# Patient Record
Sex: FEMALE | Race: WHITE
[De-identification: names, ages, dates, MRNs, and addresses within clinical notes are randomized per-mention and may not be internally consistent; named-entity substitution may affect disease eponyms.]

---

## 2018-11-01 ENCOUNTER — HOSPITAL ENCOUNTER (INPATIENT)
Dept: HOSPITAL 25 - MCHOBOUT | Age: 33
LOS: 2 days | Discharge: HOME | End: 2018-11-03
Attending: OBSTETRICS & GYNECOLOGY | Admitting: OBSTETRICS & GYNECOLOGY
Payer: COMMERCIAL

## 2018-11-01 DIAGNOSIS — O34.211: ICD-10-CM

## 2018-11-01 DIAGNOSIS — Z3A.39: ICD-10-CM

## 2018-11-01 DIAGNOSIS — E03.8: ICD-10-CM

## 2018-11-01 LAB
BASOPHILS # BLD AUTO: 0 10^3/UL (ref 0–0.2)
EOSINOPHIL # BLD AUTO: 0.1 10^3/UL (ref 0–0.6)
HCT VFR BLD AUTO: 40 % (ref 35–47)
HGB BLD-MCNC: 13 G/DL (ref 12–16)
LYMPHOCYTES # BLD AUTO: 2.1 10^3/UL (ref 1–4.8)
MCH RBC QN AUTO: 28 PG (ref 27–31)
MCHC RBC AUTO-ENTMCNC: 33 G/DL (ref 31–36)
MCV RBC AUTO: 85 FL (ref 80–97)
MONOCYTES # BLD AUTO: 0.7 10^3/UL (ref 0–0.8)
NEUTROPHILS # BLD AUTO: 7.7 10^3/UL (ref 1.5–7.7)
NRBC # BLD AUTO: 0 10^3/UL
NRBC BLD QL AUTO: 0
PLATELET # BLD AUTO: 229 10^3/UL (ref 150–450)
RBC # BLD AUTO: 4.68 10^6/UL (ref 4–5.4)
WBC # BLD AUTO: 10.7 10^3/UL (ref 3.5–10.8)

## 2018-11-01 PROCEDURE — 36415 COLL VENOUS BLD VENIPUNCTURE: CPT

## 2018-11-01 PROCEDURE — 86901 BLOOD TYPING SEROLOGIC RH(D): CPT

## 2018-11-01 PROCEDURE — 85025 COMPLETE CBC W/AUTO DIFF WBC: CPT

## 2018-11-01 PROCEDURE — 86850 RBC ANTIBODY SCREEN: CPT

## 2018-11-01 PROCEDURE — 0KQM0ZZ REPAIR PERINEUM MUSCLE, OPEN APPROACH: ICD-10-PCS | Performed by: OBSTETRICS & GYNECOLOGY

## 2018-11-01 PROCEDURE — 86900 BLOOD TYPING SEROLOGIC ABO: CPT

## 2018-11-01 PROCEDURE — 10907ZC DRAINAGE OF AMNIOTIC FLUID, THERAPEUTIC FROM PRODUCTS OF CONCEPTION, VIA NATURAL OR ARTIFICIAL OPENING: ICD-10-PCS | Performed by: OBSTETRICS & GYNECOLOGY

## 2018-11-01 RX ADMIN — IBUPROFEN PRN MG: 600 TABLET, FILM COATED ORAL at 16:33

## 2018-11-01 RX ADMIN — DOCUSATE SODIUM SCH: 100 CAPSULE, LIQUID FILLED ORAL at 09:41

## 2018-11-01 RX ADMIN — DOCUSATE SODIUM SCH: 100 CAPSULE, LIQUID FILLED ORAL at 20:05

## 2018-11-01 RX ADMIN — DOCUSATE SODIUM SCH: 100 CAPSULE, LIQUID FILLED ORAL at 18:55

## 2018-11-01 RX ADMIN — IBUPROFEN PRN MG: 600 TABLET, FILM COATED ORAL at 09:42

## 2018-11-01 NOTE — HP
General Information





- Reason for Visit


Irreg ctx throughout the day, but increased intensity about 2-3 hrs ago.


Pt with h/o  and subsequent .  Desires another .





- General Information


Maternal Age: 33


Grav: 3


Para: 2


SAB: 0


IEA: 0





Estimated Due Date: 18


Determined By: LMP


Gestational Age in Weeks/Days: 39+5 wks


Maternal Blood Type and Rh: O Positive





- Results this Pregnancy


Serology/RPR Result: Non-Reactive


Rubella Result: Immune


HBsAg Result: Negative


HIV Result: Negative


GBS Culture Result: Positive





Past Medical History


Delivery History: Hx C/Section, Hx Uncomplicated Vaginal Delivery


Pertinent Past Medical History: See  Records


Past Medical History Comment: 


Hashimoto's, IBS, migraines


Pertinent Past Surgical History: See  Records


Past Surgical History Comment: 


, cholecytectomy, ulnar nerve


Pertinent Family History: Non-Contributory





- Antepartal Records


Antepartal Records: Reviewed, Pregnancy Complicated by: - h/o  and 





Review of Systems


Constitutional: Uncomfortable


CV Complaint: No


Respiratory: Shortness of Breath: No


Gastrointestinal: No Nausea/Vomiting


Genitourinary: No Dysuria, No Bleeding, No Leaking Fluid


Musculoskeletal: Contractions


Neurological: No Headache


Fetal Movement: Normal





Exam


Allergies/Adverse Reactions: 


Allergies





No Known Allergies Allergy (Verified 18 14:08)


 








156/88 (during ctx), afebrile





- Measurements


Height: 5 ft 7 in


Weight: 161 lb


Weight in lbs: [id number]


Body Mass Index (BMI): 25.2


Pre-Pregnancy Weight: 124 lb


Weight Gained This Pregnancy: 37 lbs and 0 ozs





- Exam


Breast: Breast Exam Deferred


Heart: Normal Rhythm/Heart Sounds


HEENT: No Significant Findings


Lungs: Clear Bilaterally


Rectal: Rectal Exam Deferred





- Abdominal Exam


Abdomen Exam: Non-Tender, Fundal Height Consistent with Dates





- Ultrasound/Biophysical Profile


Ultrasound Status: Not Done





Targeted Exam Findings


Estimated Fetal Weight: 7lb 8oz


Cervical Exam: 6cm


Effacement: 90%


Station: 0


Presenting Part: Vertex


Membrane Status: Intact





EFM Findings





- External Fetal Monitor Findings


Baseline Fetal Heart Rate: 125


External Fetal Monitor Findings: Accelerations Present, No Pattern of Variable 

or Late Decelerations, Variability Moderate


Contractions: Regular, Strong


Contraction Frequency: Q2-3





Assessment/Plan





- Assessment


39+5 wks with h/o C/S and  in active labor.  Very reassuring fetal status.


Desires another TOLAC.  We reviewed risks including emergent C/S and uterine 

rupture, possible fetal injury, etc and pt desires to proceed.





- Obstetrical Risk Factors


Obstetrical Risk Factors: GBS Positive, Previous C/Section in Labor





- Plan


Plan: Antibiotic Prophylaxis, Admit - Anticipate Vaginal Delivery


Plan Comment: 


Dr. Rodriguez from anesthesia aware of  and will come in to place epidural.





- Date/Time of Admission


Date of Admission: 18


Time of Admission: 01:39

## 2018-11-01 NOTE — PROCNOTE
[facility] OB: Delivery Note





- Delivery


  ** A


Date of Birth: [birth year]


Time of Birth: 06:59


 Sex: Male


Apgar Score 1 Minute: 8


Apgar Score 5 Minutes: 9


Gestational Age in Weeks and Days at Delivery: 39 Weeks and 5 Days


Delivery Method: Spontaneous Vaginal


Labor: Spontaneous


Did Patient attempt ?: Yes, Successful 


Amniotic Fluid: Clear


Estimated Blood Loss: 400


Anesthesia/Analgesia: CEI for Labor


Delivered By: Janes Interiano





- Nursery


Level of Nursery: Regular/Bedside





- Perineum


Perineal Injury: 2nd Degree


Perineal Injury Comment: Repaired with 3-0 Vicryl Rapide


Perineal Repair: By Delivering Practioner





- Events


Delivery Events of Note: Pitocin During Labor, Full Course of Antibiotics


Delivery Events of Note Comment: Pt admitted at 5+ cm, active labor.  PCN 

started, AROM after about 2 hours due to some decels, clear fluid.  Pt received 

epidural which worked well.  Pt reached C/C/+1 and pushed for about 1.5 hrs.  

Head delivered in a controlled fashion, shoulders delivered easily.  Nuchal 

cord x1 reduced.  Infant placed on mother's abd.  Placenta delivered 

spontaneously and intact.

## 2018-11-02 LAB
BASOPHILS # BLD AUTO: 0 10^3/UL (ref 0–0.2)
EOSINOPHIL # BLD AUTO: 0.1 10^3/UL (ref 0–0.6)
HCT VFR BLD AUTO: 35 % (ref 35–47)
HGB BLD-MCNC: 11.6 G/DL (ref 12–16)
LYMPHOCYTES # BLD AUTO: 1.7 10^3/UL (ref 1–4.8)
MCH RBC QN AUTO: 28 PG (ref 27–31)
MCHC RBC AUTO-ENTMCNC: 33 G/DL (ref 31–36)
MCV RBC AUTO: 85 FL (ref 80–97)
MONOCYTES # BLD AUTO: 0.4 10^3/UL (ref 0–0.8)
NEUTROPHILS # BLD AUTO: 5.9 10^3/UL (ref 1.5–7.7)
NRBC # BLD AUTO: 0 10^3/UL
NRBC BLD QL AUTO: 0
PLATELET # BLD AUTO: 185 10^3/UL (ref 150–450)
RBC # BLD AUTO: 4.09 10^6/UL (ref 4–5.4)
WBC # BLD AUTO: 8.1 10^3/UL (ref 3.5–10.8)

## 2018-11-02 RX ADMIN — IBUPROFEN PRN MG: 600 TABLET, FILM COATED ORAL at 16:38

## 2018-11-02 RX ADMIN — DOCUSATE SODIUM SCH: 100 CAPSULE, LIQUID FILLED ORAL at 09:37

## 2018-11-02 RX ADMIN — IBUPROFEN PRN MG: 600 TABLET, FILM COATED ORAL at 05:07

## 2018-11-02 RX ADMIN — DOCUSATE SODIUM SCH: 100 CAPSULE, LIQUID FILLED ORAL at 13:28

## 2018-11-03 VITALS — SYSTOLIC BLOOD PRESSURE: 134 MMHG | DIASTOLIC BLOOD PRESSURE: 74 MMHG

## 2018-11-03 RX ADMIN — IBUPROFEN PRN MG: 600 TABLET, FILM COATED ORAL at 08:25

## 2018-11-03 RX ADMIN — DOCUSATE SODIUM SCH: 100 CAPSULE, LIQUID FILLED ORAL at 09:34

## 2018-11-03 RX ADMIN — IBUPROFEN PRN MG: 600 TABLET, FILM COATED ORAL at 00:10
